# Patient Record
(demographics unavailable — no encounter records)

---

## 2025-02-13 NOTE — HISTORY OF PRESENT ILLNESS
[FreeTextEntry1] : February 13, 2025 No joint apin No joint swelling No rashes No other medicaitons No nsaids Skipped doses due to infecitons  May 29, 2024 Patient returns for follow-up via telephonic visit. Discussed with patient.  You have chosen to receive care through the use of tele-media.  Telemedia enables health care providers at different locations to provide safe, effective, and convenient care through the use of technology.  Please note this is a billable encounter.  Patient understands that I cannot perform a physical exam and that patient may need to come to the clinic to complete the assessment.  Patient agreed verbally to understanding the risks and benefits of telemedia as explained. Patient is located in [place], provider is located in [place]. Patient overall feeling well Since last visit, continues Humira 40 mg every 2 weeks Patient has stopped methotrexate, continues folic acid Patient reports pain much improved No pain in the feet Denies pain in the hands Some psoriasis persist, particularly behind the ear, plaques much improved. Patient treated with mometasone topically as needed, follows with dermatologist as needed. No recent infections Labs from March reviewed with patient.   September 21, 2023 Patient returns for follow-up via telephonic visit.   Patient overall feeling some improvement since starting methotrexate in June 2023, now taking methotrexate 15 mg weekly with folic acid 1 mg/day. Labs reviewed from July 2023 1 month after starting, in the normal range. Patient with pain in the first toes bilaterally left more than right, also pain over the left Achilles and the bottom of the heel. Patient feels some improvement in the thumbs with decreased swelling and decreased pain. Foot pain persists.  Third toe dactylitis persists with decreased pain. Discussed again obtaining authorization for Humira given persistence in symptoms despite methotrexate use for 3 months.  January 27, 2023 initial visit Previous visits: 37-year-old man referred for rheumatology evaluation  Patient seen via telehealth visit, Discussed with patient.  You have chosen to receive care through the use of tele-media.  Telemedia enables health care providers at different locations to provide safe, effective, and convenient care through the use of technology.  Please note this is a billable encounter.  Patient understands that I cannot perform a physical exam and that patient may need to come to the clinic to complete the assessment.  Patient agreed verbally to understanding the risks of benefits of telemedia as explained.  Patient with history of psoriasis followed by dermatologist Treated with mometasone topically with good result. About 2 years ago developed left middle toe swelling.  Initially thought secondary to running Was seen by orthopedics March 26, 2021 for discomfort in the left third toe and on the ball of the foot. Had imaging in March 2021 Initially diagnosed with neuroma.  Had injection to the third webspace with dexamethasone Had blood tests at that time with CBC ESR CRP and arthritis panel, ANGELA negative rheumatoid factor negative and CCP negative  Had MRI March 21, 2021 Focal strain of the plantar is interosseous muscles at the level of the second distal metatarsal with associated mild reactive third flexor tenosynovitis.  No acute fracture.  Note is made of a 7 mm neuroma within the third interspace as well as second and third interspace intermetatarsal bursitis.  No bone marrow signal abnormalities are present.  There is no evidence of acute fracture or dislocation.  No areas of erosion or aggressive appearing destruction are identified.  The visualized flexor and extensor tendons are intact.  There is no joint effusion.  The ligaments and plantar plates are intact.  Now feels the right thumb in the last few weeks has been involved.  Recently taking more Motrin, takes 600 mg twice daily with minimal benefit although it does help with the pain. On the left foot the middle toe and now the first toe as well.  The third and fourth toes have no color changes but the big toe sometimes have discoloration No nail changes noted  Mother has rheumatoid arthritis Patient does not take any other medicines other than Motrin. No drug allergies No other medical issues.  April 6, 2023 Patient returns for follow-up via telehealth visit Discussed with patient.  You have chosen to receive care through the use of tele-media.  Telemedia enables health care providers at different locations to provide safe, effective, and convenient care through the use of technology.  Please note this is a billable encounter.  Patient understands that I cannot perform a physical exam and that patient may need to come to the clinic to complete the assessment.  Patient agreed verbally to understanding the risks of benefits of telemedia as explained. Patient returns for follow-up.  Patient continues NSAIDs for pain in the toes, minimal benefit to date.  Thumb pain persists.  Toe pain and swelling persist.  Sometimes symptoms wax and wane, but has not improved.  Patient unable to run or cycle due to the pain.  Some psoriasis behind the ear, sometimes on the back, improved with topical steroids.  Discussed treatment options with patient, discussed Biologics such as TNF, Cosentyx, Taltz, Stelara. James risks and benefits of different classes of Biologics.  Reviewed lab results, reviewed MRI results again, orthopedic evaluation pending.  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